# Patient Record
Sex: FEMALE | Race: WHITE | NOT HISPANIC OR LATINO | Employment: FULL TIME | URBAN - METROPOLITAN AREA
[De-identification: names, ages, dates, MRNs, and addresses within clinical notes are randomized per-mention and may not be internally consistent; named-entity substitution may affect disease eponyms.]

---

## 2017-04-11 ENCOUNTER — TELEPHONE (OUTPATIENT)
Dept: NEUROLOGY | Facility: CLINIC | Age: 57
End: 2017-04-11

## 2017-04-11 NOTE — TELEPHONE ENCOUNTER
----- Message from Natalie Pelaez sent at 4/11/2017  9:39 AM CDT -----  Contact: Pt  Pt missed a call from the Dr's Office and would like a return call from the nurse.    Pt can be reached at [phone].    Thanks

## 2017-04-11 NOTE — TELEPHONE ENCOUNTER
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  She is requesting Staff contact her.    She can be reached at [phone].    Thank you.

## 2020-11-23 ENCOUNTER — CLINICAL SUPPORT (OUTPATIENT)
Dept: INTERNAL MEDICINE | Facility: CLINIC | Age: 60
End: 2020-11-23
Payer: COMMERCIAL

## 2020-11-23 ENCOUNTER — OFFICE VISIT (OUTPATIENT)
Dept: NEUROLOGY | Facility: CLINIC | Age: 60
End: 2020-11-23
Payer: COMMERCIAL

## 2020-11-23 VITALS
SYSTOLIC BLOOD PRESSURE: 114 MMHG | BODY MASS INDEX: 27.42 KG/M2 | DIASTOLIC BLOOD PRESSURE: 56 MMHG | WEIGHT: 154.75 LBS | HEIGHT: 63 IN | HEART RATE: 79 BPM

## 2020-11-23 DIAGNOSIS — I10 HYPERTENSION, UNSPECIFIED TYPE: ICD-10-CM

## 2020-11-23 DIAGNOSIS — K21.9 GASTROESOPHAGEAL REFLUX DISEASE WITHOUT ESOPHAGITIS: ICD-10-CM

## 2020-11-23 DIAGNOSIS — I69.30 SEQUELA OF LACUNAR INFARCTION: ICD-10-CM

## 2020-11-23 DIAGNOSIS — G43.E09 CHRONIC MIGRAINE WITH AURA: Primary | ICD-10-CM

## 2020-11-23 DIAGNOSIS — E03.9 HYPOTHYROIDISM, UNSPECIFIED TYPE: ICD-10-CM

## 2020-11-23 DIAGNOSIS — G47.30 SLEEP APNEA, UNSPECIFIED TYPE: ICD-10-CM

## 2020-11-23 DIAGNOSIS — E78.5 HYPERLIPIDEMIA, UNSPECIFIED HYPERLIPIDEMIA TYPE: ICD-10-CM

## 2020-11-23 PROCEDURE — 96372 THER/PROPH/DIAG INJ SC/IM: CPT | Mod: S$GLB,,, | Performed by: PSYCHIATRY & NEUROLOGY

## 2020-11-23 PROCEDURE — 99999 PR PBB SHADOW E&M-NEW PATIENT-LVL III: ICD-10-PCS | Mod: PBBFAC,,, | Performed by: PSYCHIATRY & NEUROLOGY

## 2020-11-23 PROCEDURE — 99999 PR PBB SHADOW E&M-NEW PATIENT-LVL III: CPT | Mod: PBBFAC,,, | Performed by: PSYCHIATRY & NEUROLOGY

## 2020-11-23 PROCEDURE — 1125F PR PAIN SEVERITY QUANTIFIED, PAIN PRESENT: ICD-10-PCS | Mod: S$GLB,,, | Performed by: PSYCHIATRY & NEUROLOGY

## 2020-11-23 PROCEDURE — 96372 PR INJECTION,THERAP/PROPH/DIAG2ST, IM OR SUBCUT: ICD-10-PCS | Mod: S$GLB,,, | Performed by: PSYCHIATRY & NEUROLOGY

## 2020-11-23 PROCEDURE — 99205 OFFICE O/P NEW HI 60 MIN: CPT | Mod: S$GLB,,, | Performed by: PSYCHIATRY & NEUROLOGY

## 2020-11-23 PROCEDURE — 1125F AMNT PAIN NOTED PAIN PRSNT: CPT | Mod: S$GLB,,, | Performed by: PSYCHIATRY & NEUROLOGY

## 2020-11-23 PROCEDURE — 99205 PR OFFICE/OUTPT VISIT, NEW, LEVL V, 60-74 MIN: ICD-10-PCS | Mod: S$GLB,,, | Performed by: PSYCHIATRY & NEUROLOGY

## 2020-11-23 PROCEDURE — 3008F BODY MASS INDEX DOCD: CPT | Mod: CPTII,S$GLB,, | Performed by: PSYCHIATRY & NEUROLOGY

## 2020-11-23 PROCEDURE — 3008F PR BODY MASS INDEX (BMI) DOCUMENTED: ICD-10-PCS | Mod: CPTII,S$GLB,, | Performed by: PSYCHIATRY & NEUROLOGY

## 2020-11-23 RX ORDER — PROMETHAZINE HYDROCHLORIDE 25 MG/1
25 TABLET ORAL EVERY 4 HOURS
Qty: 30 TABLET | Refills: 1 | Status: SHIPPED | OUTPATIENT
Start: 2020-11-23 | End: 2021-07-07

## 2020-11-23 RX ORDER — DIPHENHYDRAMINE HCL 50 MG
50 CAPSULE ORAL EVERY 6 HOURS PRN
Qty: 30 CAPSULE | Refills: 3 | Status: SHIPPED | OUTPATIENT
Start: 2020-11-23

## 2020-11-23 RX ORDER — KETOROLAC TROMETHAMINE 30 MG/ML
30 INJECTION, SOLUTION INTRAMUSCULAR; INTRAVENOUS
Status: COMPLETED | OUTPATIENT
Start: 2020-11-23 | End: 2020-11-23

## 2020-11-23 RX ORDER — PRAVASTATIN SODIUM 10 MG/1
10 TABLET ORAL DAILY
Qty: 90 TABLET | Refills: 3 | Status: SHIPPED | OUTPATIENT
Start: 2020-11-23 | End: 2021-12-08

## 2020-11-23 RX ORDER — KETOROLAC TROMETHAMINE 30 MG/ML
30 INJECTION, SOLUTION INTRAMUSCULAR; INTRAVENOUS EVERY 8 HOURS PRN
Qty: 10 ML | Refills: 1 | Status: SHIPPED | OUTPATIENT
Start: 2020-11-23 | End: 2021-03-25

## 2020-11-23 RX ADMIN — KETOROLAC TROMETHAMINE 30 MG: 30 INJECTION, SOLUTION INTRAMUSCULAR; INTRAVENOUS at 02:11

## 2020-11-23 NOTE — LETTER
November 24, 2020      Huey Hou MD  [address] [facility] [place] [address]           [facility] [id number]  [place] [address]  Phone: [phone]  Fax: [phone]          Patient: Teena Amezquita   MR Number: [id number]   YOB: 1960   Date of Visit: 11/23/2020       Dear Dr. Huey Hou:    Thank you for referring Teena Amezquita to me for evaluation. Attached you will find relevant portions of my assessment and plan of care.    If you have questions, please do not hesitate to call me. I look forward to following Teena Amezquita along with you.    Sincerely,    Vilma Dodson MD    Enclosure  CC:  No Recipients    If you would like to receive this communication electronically, please contact [email or web address] or [phone] to request more information on [employer] access.    For providers and/or their staff who would like to refer a patient to [facility], please contact us through our one-stop-shop provider referral line, [facility] [place], at [phone].    If you feel you have received this communication in error or would no longer like to receive these types of communications, please e-mail [email or web address]
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
## 2020-11-23 NOTE — PATIENT INSTRUCTIONS
Thank you for coming    I recommend the following:    You have chronic migraine with aura:    Abortives:  -Stop Excedrin- overuse can cause medication overuse headache   -Try Ubrelvy 50 mg at headache onset and can repeat dose 2 hours later if still with headache   -Stop Nurtec  If Ubrelvy does not work and you have severe pain you can try the following->  -Toradol 30 mg intramuscular shot, benadryl 25 mg and phenergan 25 mg - up to 3 times/ day   - use sparingly once a week or so   -DHE and Triptans - contrindicated due to h/ stroke    Research the Magnetic stimulator and Cefaly       Prophylactics:  Continue Vyepti infusions   If no benefit could go back to Emgality   Nerve blocks with Dr. Alie Hernandez once cleared by an Allergy specialist      Migraine jagruti brian- download on your phone       H/o stroke  Take a baby Aspirin 81 mg  every day   [id number], keep an eye on your cholesterol   - Take Pravachol 10 mg at night     See the Sleep specialist        Patient Education  American Headache Society (AHS)  http://www.americanheadachesociety.org  Patient education material and headache journals  National Headache Foundation  http://www.headaches.org  Patient education material  National Migraine Association (MAGNUM)  http://www.migraines.org  Patient education material and news and publications on migraine.  Migraine Action Associateion  http://www.migraine.org.uk  Good education and support group training  Organization for the Understanding of Cluster Headache (OUCH)  http://www.ouch-us.org  Best sites devoted to cluster headache  Relaxation  http://www.helpguide.org  On L side column choose Stress & Trauma, then relaxation. A good guide on different  relaxation techniques.  Also tips for better sleeping on L side column choose sleep.        How to keep a Headache Calendar   Keeping a calendar of your headaches will help you and your doctor in the care of your  headaches.  Calendars will help identify:  -headache  triggers  -if you are overusing medications  -if you are waiting too long to treat your headaches  -how many days a week you are having a headache  -if your headaches are decreasing with time while on a preventive medication.  How to keep an effective headache calendar:  Access  -Keep a small calendar at your bedside. Do not feel that you have to complete the  calendar during a headache, but having it in one location will make it easier to  remember to fill in.  -Bring your calendar with you to every office visit to be reviewed with your doctor  Severity  -When you have a headache or before you go to bed if you have daily headaches, log in  the severity of your headache on a scale of 1-10 (10 being the worse)  Medication  -log in what medication you took to abort your headache.  -you do not have to log your daily medications in the journal  -use abbreviations or symbols to help you fit everything in, just remember what they are  when the doctor asks you what your symbols mean.  Triggers  -if you can identify a trigger, write it in, if not, skip this part of the calendar.      Headache Triggers  Almost anything can trigger or precipitate a headache. Research has shown that  triggers are very individual to patients, and that no single entity acts as a trigger for all  patients with headaches. Often, a trigger may not consistently provoke an attack. For  example, drinking a glass of red wine the other day triggered a migraine, but it may not  trigger a migraine 2 days later. Sometimes being exposed to multiple triggers is what is  needed to bring on a headache attack (drinking a glass of red wine after a stressful day  where you skipped lunch).  In patients who experience migraine headaches, their brain may be hypersensitive to  changes in their internal and external environment. Following exposure to a sufficient  trigger, their brain may respond by initiating a chain of electrical events that are  clinically  expressed as a migraine.  Some environmental changes more commonly trigger headache attacks. In a survey of  200 patients with migraine, the most commonly cited triggers were as follows:  Physical or emotional stress (77%)  Menses (72%)  Exposure to bright or flickering light (65%)  Various odors (61%)  Other commonly named triggers for headache are alcohol, aged cheeses, nitrate or  monosodium glutamate containing food items, changes in weather pattern, changes in  sleep cycles, and fasting1  .  1 Adapted from Stephen BORRERO. The Truth About Triggers. Headache 2008;48:499-500      Tips for Migraine Control  Changes in your internal and external environment can trigger migraine headaches. It  is important to control your environment the best you can. The following are some tips  to help you regulate your internal environment and reduce the frequency of migraines.  1. Good sleep habits are essential for good migraine control. Try to get the  same amount of sleep every night. Go to bed and wake up around the same  time every day, including weekends.  2. Reduce your caffeine intake. Excessive caffeine can cause headaches, so try  to use minimal amounts of caffeine every day. Remember, caffeine is found not  only in coffee, but in soda and tea as well.  3. Exercise daily. Mild to moderate exercise for 20-30 minutes a day can help  reduce the frequency of headaches by lowering your stress level, and increasing  your bodys natural endorphins.  4. Eat regular meals. Skipping meals can lead to a lowering in your blood sugar  that can trigger a migraine. Keep snacks with you when you are travelling if you  think you may miss a meal.  5. Drink more water and less alcohol. Keeping hydrated is essential for good  health and migraine control. Alcohol can be a trigger for migraines, so drink  alcohol in moderation.  6. Limit medication use. If you are using over the counter or prescription  medication to treat 3 or  more headaches a week, you may be causing a  headache.  7. Keep a headache calendar. Note things that may trigger your headaches, like  lack of sleep, weather changes, missed meals, stressful events, alcohol use,  caffeine intake, or eating certain foods. Keep note of medications you are using  to treat your headache. This will help you identify and manage triggers for your  headache, track how often you are using medications, and give you a better  sense of what things are working or not working for your headaches.    Medication for headache control  Your doctor may prescribe medication to treat your headaches. For better control of  headaches you may be placed on a preventive medication and to treat a headache  attack you may be placed on an abortive medication.  Preventive medication to treat headaches  A preventive medication is a medication that you take every day to help prevent  headaches from occurring, to make headaches less severe when they do occur, and to  make headaches more responsive to medication you take for an attack.  You are a candidate to be on a preventive medication when you are having one or more  headaches a week, or if when your headaches occur they are so debilitating that you  prefer to prevent them from happening.  There is no medication that is specially designed to prevent headaches. Instead, we  use medications from three major classes of drugs:  Anti-depressants- such as nortriptiline, venlafaxine, and duloxetine  Anti-seizure medications- such as topiramate, depakote, and gabapentin  Anti-hypertensive medications- such as propranolol, verapamil, and lisinopril  Your doctor will interview you to assess what type of headaches you have, how often  they are occurring, how debilitating they are, and what your other medical conditions  and risk factors you have. Based on this profile, your doctor may choose to place you  on a preventive medication that belongs in one of the classes above.  This does not  mean that you are depressed, have seizures, or high blood pressure.  Most preventive medications take 6-8 weeks before they reach maximum benefit. In  this time you may experience some side effects of the medication without maximum  relief. It does not take much time for headaches to become out of control and severe,  but it takes time for the medication to work. While this may be frustrating, it is important  to continue taking your medication to fully assess if it was helpful in treating your  headaches.  Abortive medication to treat headaches  An abortive medication is a medication that you take when you are having a headache  attack. It can be a single medication, or a combination of medications to be taken at the  start of a headache. It is important to treat a headache as soon as it begins; the longer  you wait to treat, the less likely the medication will work to treat the headache attack. It  is also important not to treat your headache more than 2-3 days a week because of the  risk of medication overuse headaches.     There are many types of medications used to abort a headache. Some are specific for  headache treatment, while others are used to treat the inflammatory response a  headache creates, or to treat both headache and other associated symptoms (like  nausea). We use medications from five major classes of drugs:  NSAIDS- such as ibuprofen, naproxen, diclofenac, etc  Triptans- such as sumatriptan, rizatriptan, zolmitriptan, etc  Ergotamine- such as DHE  Antiemetics- such as metochlopramide, Prochlorperazine, or promethazine  Antipsychotics- such as olanzapine or chlorperazine  After your interview, your doctor will prescribe you medication to treat headache attacks  based on your diagnosis and risk profile. You may be given multiple medications to  treat an attack, do not hesitate to ask to have your plan written for you so that you know  what to do when you are having a headache  attack.

## 2020-11-24 PROBLEM — I10 HYPERTENSION: Status: ACTIVE | Noted: 2020-11-24

## 2020-11-24 PROBLEM — E03.9 HYPOTHYROIDISM: Status: ACTIVE | Noted: 2020-11-24

## 2020-11-24 PROBLEM — G43.E09 CHRONIC MIGRAINE WITH AURA: Status: ACTIVE | Noted: 2020-11-24

## 2020-11-24 PROBLEM — E78.5 HYPERLIPIDEMIA: Status: ACTIVE | Noted: 2020-11-24

## 2020-11-24 PROBLEM — K21.9 GASTROESOPHAGEAL REFLUX DISEASE WITHOUT ESOPHAGITIS: Status: ACTIVE | Noted: 2020-11-24

## 2020-11-24 RX ORDER — LANOLIN ALCOHOL/MO/W.PET/CERES
1000 CREAM (GRAM) TOPICAL
COMMUNITY

## 2020-11-24 RX ORDER — SYRING-NEEDL,DISP,INSUL,0.3 ML 29 G X1/2"
296 SYRINGE, EMPTY DISPOSABLE MISCELLANEOUS ONCE
COMMUNITY

## 2020-11-24 RX ORDER — UBROGEPANT 100 MG/1
100 TABLET ORAL ONCE AS NEEDED
Qty: 30 TABLET | Refills: 2 | Status: SHIPPED | OUTPATIENT
Start: 2020-11-24 | End: 2020-11-24

## 2020-11-24 RX ORDER — MULTIVIT WITH IRON,MINERALS
1 TABLET ORAL
COMMUNITY

## 2020-11-24 RX ORDER — AMLODIPINE BESYLATE 5 MG/1
TABLET ORAL
COMMUNITY
Start: 2020-08-31

## 2020-11-24 RX ORDER — OXYBUTYNIN CHLORIDE 10 MG/1
10 TABLET, EXTENDED RELEASE ORAL
COMMUNITY
Start: 2020-10-22 | End: 2021-10-22

## 2020-11-24 RX ORDER — RIMEGEPANT SULFATE 75 MG/75MG
75 TABLET, ORALLY DISINTEGRATING ORAL ONCE AS NEEDED
COMMUNITY

## 2020-11-24 RX ORDER — METOPROLOL SUCCINATE 50 MG/1
TABLET, EXTENDED RELEASE ORAL
COMMUNITY
Start: 2020-10-09

## 2020-11-24 RX ORDER — KETOROLAC TROMETHAMINE 10 MG/1
10 TABLET, FILM COATED ORAL
COMMUNITY
Start: 2020-09-16 | End: 2021-05-04 | Stop reason: SDUPTHER

## 2020-11-24 RX ORDER — DEXLANSOPRAZOLE 60 MG/1
CAPSULE, DELAYED RELEASE ORAL
COMMUNITY
Start: 2020-06-16

## 2020-11-24 RX ORDER — CHOLECALCIFEROL (VITAMIN D3) 25 MCG
1000 TABLET ORAL
COMMUNITY

## 2020-11-24 RX ORDER — GINKGO BILOBA LEAF EXTRACT 60 MG
500 CAPSULE ORAL
COMMUNITY

## 2020-11-24 RX ORDER — LOSARTAN POTASSIUM 25 MG/1
25 TABLET ORAL
COMMUNITY
Start: 2020-04-27 | End: 2021-04-27

## 2020-11-24 RX ORDER — ALBUTEROL SULFATE 90 UG/1
2 AEROSOL, METERED RESPIRATORY (INHALATION)
COMMUNITY

## 2020-11-24 RX ORDER — FLUTICASONE FUROATE AND VILANTEROL TRIFENATATE 100; 25 UG/1; UG/1
POWDER RESPIRATORY (INHALATION)
COMMUNITY
Start: 2020-11-17

## 2020-11-24 RX ORDER — LEVOTHYROXINE SODIUM 88 UG/1
TABLET ORAL
COMMUNITY
Start: 2020-09-25

## 2020-11-24 RX ORDER — BIOTIN 1 MG
500 TABLET ORAL
COMMUNITY

## 2020-11-27 ENCOUNTER — TELEPHONE (OUTPATIENT)
Dept: NEUROLOGY | Facility: CLINIC | Age: 60
End: 2020-11-27

## 2020-11-27 NOTE — TELEPHONE ENCOUNTER
Called patient back and changed appointment to NP at 2:30 the same day. Patient stated that this works better since she is coming from [place]. Patient also stated that she is getting the lab work done to check for the allergy and will bring results with her.

## 2020-11-27 NOTE — TELEPHONE ENCOUNTER
----- Message from Gloria Fair sent at 11/27/2020 10:34 AM CST -----  Contact: BRIANA DE [[id number]]  Type: Patient Call Back Who called:BRIANA DE [[id number]] What is the request in detail:Patient would like a call back in regards to a order being put in the system for her to have blood work for the lidocaine allergy. [facility] Would the patient rather a call back or a response via [facility]? Call back Best call back number:[phone] (mobile)   Additional Information:

## 2020-11-29 ENCOUNTER — PATIENT MESSAGE (OUTPATIENT)
Dept: NEUROLOGY | Facility: CLINIC | Age: 60
End: 2020-11-29

## 2020-11-30 ENCOUNTER — PATIENT MESSAGE (OUTPATIENT)
Dept: NEUROLOGY | Facility: CLINIC | Age: 60
End: 2020-11-30

## 2020-11-30 ENCOUNTER — TELEPHONE (OUTPATIENT)
Dept: NEUROLOGY | Facility: CLINIC | Age: 60
End: 2020-11-30

## 2020-11-30 DIAGNOSIS — T78.40XA ALLERGY, INITIAL ENCOUNTER: Primary | ICD-10-CM

## 2020-11-30 NOTE — TELEPHONE ENCOUNTER
----- Message from Roxy Aguilar sent at 11/30/2020 10:11 AM CST -----  Contact: self  Type: Needs Medical Advice  Who Called:  leidy    Best Call Back Number: [phone] (home)     Additional Information: patient schedule for a nerve Block tomorrow, please contact to advise if lidocaine will be used, as she is allergic, contact to advise if something else can be used.

## 2020-11-30 NOTE — TELEPHONE ENCOUNTER
Spoke with patient in regards to seeing an allergist who would order it. Dr. Dodson states she can only put a referral in to see allergy specialist

## 2020-11-30 NOTE — TELEPHONE ENCOUNTER
----- Message from Grisel Taveras sent at 11/30/2020  8:39 AM CST -----  Contact: self @ [phone]  Pt says she needs to get her labs done 1st thing this morning so that they can be transferred on time this morning to have her results by tomorrow so she can get her procedure done.  Pls fax orders to [facility] at [phone] attn: Yvette.  Pls call pt after they are faxed.

## 2020-12-01 ENCOUNTER — TELEPHONE (OUTPATIENT)
Dept: NEUROLOGY | Facility: CLINIC | Age: 60
End: 2020-12-01

## 2020-12-01 ENCOUNTER — OFFICE VISIT (OUTPATIENT)
Dept: NEUROLOGY | Facility: CLINIC | Age: 60
End: 2020-12-01
Payer: COMMERCIAL

## 2020-12-01 VITALS — TEMPERATURE: 98 F | WEIGHT: 154.31 LBS | RESPIRATION RATE: 20 BRPM | BODY MASS INDEX: 27.34 KG/M2 | HEIGHT: 63 IN

## 2020-12-01 DIAGNOSIS — G43.719 INTRACTABLE CHRONIC MIGRAINE WITHOUT AURA AND WITHOUT STATUS MIGRAINOSUS: Primary | ICD-10-CM

## 2020-12-01 PROCEDURE — 1125F PR PAIN SEVERITY QUANTIFIED, PAIN PRESENT: ICD-10-PCS | Mod: S$GLB,,, | Performed by: NURSE PRACTITIONER

## 2020-12-01 PROCEDURE — 99215 PR OFFICE/OUTPT VISIT, EST, LEVL V, 40-54 MIN: ICD-10-PCS | Mod: 25,S$GLB,, | Performed by: NURSE PRACTITIONER

## 2020-12-01 PROCEDURE — 1125F AMNT PAIN NOTED PAIN PRSNT: CPT | Mod: S$GLB,,, | Performed by: NURSE PRACTITIONER

## 2020-12-01 PROCEDURE — 3008F PR BODY MASS INDEX (BMI) DOCUMENTED: ICD-10-PCS | Mod: CPTII,S$GLB,, | Performed by: NURSE PRACTITIONER

## 2020-12-01 PROCEDURE — 99999 PR PBB SHADOW E&M-EST. PATIENT-LVL V: ICD-10-PCS | Mod: PBBFAC,,, | Performed by: NURSE PRACTITIONER

## 2020-12-01 PROCEDURE — 3008F BODY MASS INDEX DOCD: CPT | Mod: CPTII,S$GLB,, | Performed by: NURSE PRACTITIONER

## 2020-12-01 PROCEDURE — 96372 PR INJECTION,THERAP/PROPH/DIAG2ST, IM OR SUBCUT: ICD-10-PCS | Mod: S$GLB,,, | Performed by: NURSE PRACTITIONER

## 2020-12-01 PROCEDURE — 99215 OFFICE O/P EST HI 40 MIN: CPT | Mod: 25,S$GLB,, | Performed by: NURSE PRACTITIONER

## 2020-12-01 PROCEDURE — 99999 PR PBB SHADOW E&M-EST. PATIENT-LVL V: CPT | Mod: PBBFAC,,, | Performed by: NURSE PRACTITIONER

## 2020-12-01 PROCEDURE — 96372 THER/PROPH/DIAG INJ SC/IM: CPT | Mod: S$GLB,,, | Performed by: NURSE PRACTITIONER

## 2020-12-01 RX ORDER — ONDANSETRON 2 MG/ML
4 INJECTION INTRAMUSCULAR; INTRAVENOUS
Status: COMPLETED | OUTPATIENT
Start: 2020-12-01 | End: 2020-12-01

## 2020-12-01 RX ORDER — KETOROLAC TROMETHAMINE 30 MG/ML
30 INJECTION, SOLUTION INTRAMUSCULAR; INTRAVENOUS
Status: COMPLETED | OUTPATIENT
Start: 2020-12-01 | End: 2020-12-01

## 2020-12-01 RX ADMIN — KETOROLAC TROMETHAMINE 30 MG: 30 INJECTION, SOLUTION INTRAMUSCULAR; INTRAVENOUS at 03:12

## 2020-12-01 RX ADMIN — ONDANSETRON 4 MG: 2 INJECTION INTRAMUSCULAR; INTRAVENOUS at 03:12

## 2020-12-01 NOTE — TELEPHONE ENCOUNTER
----- Message from Je Contreras sent at 12/1/2020  8:16 AM CST -----  Regarding: Pt advice  Contact: pt  Type:  Patient Returning Call    Who Called:  pt  Does the patient know what this is regarding?:  please follow up with pt to verify medicine used for appt. Stating will be leaving home at 11  Best Call Back Number:    Additional Information:  Thank you

## 2020-12-01 NOTE — TELEPHONE ENCOUNTER
----- Message from Karyn Campo MA sent at 12/1/2020 10:21 AM CST -----  PT is requesting a call back in regards to her scheduled appt today   Call back # [phone]

## 2020-12-01 NOTE — TELEPHONE ENCOUNTER
Patient needs allergy clearance prior to any procedures involving lidocaine. This was discussed during visit today.

## 2020-12-01 NOTE — TELEPHONE ENCOUNTER
Patient arrived early for appointment. Wants to discuss about allergy to Lidocaine. Informed that she can discuss with NP before anything is done to her. Verbalized understanding.

## 2020-12-01 NOTE — PROGRESS NOTES
"Date of service: 12/1/2020  Referring provider: No ref. provider found     Subjective:      Chief complaint: Headache       Patient ID: Teena Amezquita is a 60 y.o. female with past medical history of HTN, HLD, hypothyroidism, GERD and migraine who presents for new patient evaluation of headache.    She saw Dr. Dodson 11/23/2020 who recommended nerve blocks. Referred to this clinic for nerve blocks.  She is also followed by Dr. Hou as well. Per his note, patient has "seen 16 different headache specialist including specialist at [facility] in [facility]"    History of Present Illness    ORIGINAL HEADACHE HISTORY - 12/1/2020  Age at onset and course over time: 1972 at age 12.  Location: all over, mostly over one eye or the other   Quality:  [] pressure [] tight [x] throbbing [x] sharp [x] stabbing   Severity: current 7 with range 4-10  Duration: days, constant  Frequency: daily, no headache free days  Headaches awaken at night?: yes, 3 nights per week   Worst time of day: varies  Associated with: [x] photophobia [x]  phonophobia [x] osmophobia [x] blurred vision Aura  [] double vision [x] loss of appetite [x] nausea [x] vomiting [x] dizziness [] vertigo  [] tinnitus [x] irritability [] sinus pressure [] problems with concentration   [x] neck tightness   Alleviated by:  [x] sleep [x] darkness [] massage [] heat [] ice [x] medication  Exacerbated by:  [x] fatigue [x] light [x] noise [x] smells [] coughing [] sneezing  [] bending over [] ovulation [] menses [] alcohol [x] change in weather [x]  stress  Ipsilateral autonomic: [] nasal congestion [] lacrimation [] ptosis [] injection [] edema [] foreign body sensation [] ear fullness   ICP:  [] transient visual obscurations  [] tinnitus   [] positional headache  [x] non-positional   Sleep habits: trouble falling asleep, trouble staying asleep, unrefreshing sleep  Caffeine intake: 2 cups coffee  Gyn status (if female): partial hysterectomy   MIDAS: 91 " indicating severe disability    Current acute treatment:  Nurtec  Phenergan  toradol - days a week  Tylenol - daily  Ubrelvy - 2 days per week    Current prevention:  Vyepti  Metoprolol  (losartan)  (amlodipine)     Previously tried/failed acute treatment:  nubain  Stadol  midrin  Naproxen  Sumatriptan  Demerol  zomig  Axert  Relpax  Treximet  Bupap  Fioricet  Goodys  Excedrin  Infusions (toradol, decadron, phenergan, DHE)    Previously tried/failed preventative treatment:  zonegran  paxil  topamax  Botox  Depakote  Keppra  Toprol XL  Emgality  Aimovig  Dilantin  lyrica  prozac  wellbutrin  celexa  Verapamil      Review of patient's allergies indicates:   Allergen Reactions    Amoxicillin-pot clavulanate Shortness Of Breath    Codeine Shortness Of Breath    Hydrocodone Rash and Shortness Of Breath    Lidocaine Shortness Of Breath     Lidocaine patch on back for migraines    Scopolamine Rash     Trans derm patch     Sulfa (sulfonamide antibiotics) Swelling    Tetracyclines Shortness Of Breath    Azithromycin Diarrhea    Imitrex [sumatriptan succinate]     Keflex [cephalexin]     Nexium [esomeprazole magnesium] Diarrhea    Nitrous oxide Other (See Comments)     during dental work started to go into coma    Paxil [paroxetine hcl]     Stadol [butorphanol tartrate]     Oxycodone Rash     Current Outpatient Medications   Medication Sig Dispense Refill    albuterol (PROVENTIL/VENTOLIN HFA) 90 mcg/actuation inhaler Inhale 2 puffs into the lungs.      amLODIPine (NORVASC) 5 MG tablet TAKE ONE TABLET BY MOUTH TWICE A DAY      ascorbic acid, vitamin C, (VITAMIN C) 100 MG tablet Take 500 mg by mouth.      biotin 1 mg tablet Take 500 mcg by mouth.      cyanocobalamin (VITAMIN B-12) 1000 MCG tablet Take 1,000 mcg by mouth.      dexlansoprazole (DEXILANT) 60 mg capsule TAKE ONE CAPSULE BY MOUTH EVERY DAY      diphenhydrAMINE (BENADRYL) 50 MG capsule Take 1 capsule (50 mg total) by mouth every 6 (six) hours as  needed for Itching. 30 capsule 3    evening primrose oil 500 mg Cap Take 500 mg by mouth.      fluticasone furoate-vilanteroL (BREO ELLIPTA) 100-25 mcg/dose diskus inhaler INHALE 1 PUFF INTO THE LUNGS ONCE DAILY      ketorolac (TORADOL) 10 mg tablet Take 10 mg by mouth.      ketorolac (TORADOL) 30 mg/mL (1 mL) injection Inject 1 mL (30 mg total) into the muscle every 8 (eight) hours as needed (no more than 3 doses in 24 hours). 10 mL 1    levothyroxine (SYNTHROID) 88 MCG tablet Take 1 tablet daily on an empty stomach      losartan (COZAAR) 25 MG tablet Take 25 mg by mouth.      magnesium citrate solution Take 296 mLs by mouth once.      metoprolol succinate (TOPROL-XL) 50 MG 24 hr tablet TAKE 1 AND 1/2 TABLETS IN THE MORNING      oxybutynin (DITROPAN-XL) 10 MG 24 hr tablet Take 10 mg by mouth.      potassium gluconate 2.5 mEq Tab Take 1 tablet by mouth.      pravastatin (PRAVACHOL) 10 MG tablet Take 1 tablet (10 mg total) by mouth once daily. 90 tablet 3    promethazine (PHENERGAN) 25 MG tablet Take 1 tablet (25 mg total) by mouth every 4 (four) hours. 30 tablet 1    vitamin D (VITAMIN D3) 1000 units Tab Take 1,000 Units by mouth.      vitamin E 100 UNIT capsule Take 450 mg by mouth.      rimegepant (NURTEC) 75 mg odt Take 75 mg by mouth once as needed for Migraine. Place ODT tablet on the tongue; alternatively the ODT tablet may be placed under the tongue       Current Facility-Administered Medications   Medication Dose Route Frequency Provider Last Rate Last Dose    ketorolac injection 30 mg  30 mg Intramuscular 1 time in Clinic/HOD Kim Hatch NP        ondansetron injection 4 mg  4 mg Intramuscular 1 time in Clinic/HOD Kim Hatch NP           Past Medical History  Past Medical History:   Diagnosis Date    Headache     Stroke        Past Surgical History  No past surgical history on file.    Family History  No family history on file.    Social History  Social History      Socioeconomic History    Marital status:      Spouse name: Not on file    Number of children: Not on file    Years of education: Not on file    Highest education level: Not on file   Occupational History    Not on file   Social Needs    Financial resource strain: Not on file    Food insecurity     Worry: Not on file     Inability: Not on file    Transportation needs     Medical: Not on file     Non-medical: Not on file   Tobacco Use    Smoking status: Former Smoker     Start date: 1979     Quit date: 1980     Years since quittin.0    Smokeless tobacco: Never Used   Substance and Sexual Activity    Alcohol use: Yes     Frequency: Monthly or less     Drinks per session: 1 or 2    Drug use: Never    Sexual activity: Yes     Partners: Male   Lifestyle    Physical activity     Days per week: Not on file     Minutes per session: Not on file    Stress: Not on file   Relationships    Social connections     Talks on phone: Not on file     Gets together: Not on file     Attends Yazdanism service: Not on file     Active member of club or organization: Not on file     Attends meetings of clubs or organizations: Not on file     Relationship status: Not on file   Other Topics Concern    Not on file   Social History Narrative    Not on file        Review of Systems  14-point review of systems as follows:   No check devin indicates NEGATIVE response   Constitutional: [] weight loss, [] change to appetite   Eyes: [] change in vision, [] double vision   Ears, nose, mouth, throat: [] frequent nose bleeds, [] ringing in the ears   Respiratory: [] cough, [] wheezing   Cardiovascular: [] chest pain, [] palpitations   Gastrointestinal: [] jaundice, [] nausea/vomiting   Genitourinary: [] incontinence, [] burning with urination   Hematologic/lymphatic: [x] easy bruising/bleeding, [] night sweats   Neurological: [] numbness, [] weakness   Endocrine: [x] fatigue, [x] heat/cold intolerance    Allergy/Immunologic: [] fevers, [] chills   Musculoskeletal: [x] muscle pain, [x] joint pain   Psychiatric: [] thoughts of harming self/others, [] depression   Integumentary: [] rashes, [] sores that do not heal     Objective:        Vitals:    12/01/20 1344   Resp: 20   Temp: 97.5 °F (36.4 °C)     Body mass index is 27.34 kg/m².    12/1/2020  Constitutional: appears in no acute distress, well-developed, well-nourished     Ears, nose, mouth, throat: external appearance of ears and nose normal, hearing intact     Spine:   CERVICAL SPINE:  ROM: reduced  MUSCLE SPASM: diffuse  FACET LOADING: bilateral  SPURLING: no  DINO / GWEN tender: mild    Psychiatric: normal judgment and insight. Oriented to person, place, and time.     Neurologic:   Cortical functions: recent and remote memory intact, normal attention span and concentration, speech fluent, adequate fund of knowledge       Data Review:     I have personally reviewed the referring provider's notes, labs, & imaging made available to me today.      RADIOLOGY STUDIES:  I have personally reviewed the pertinent images performed.       No results found for this or any previous visit.    No results found for: BNP, NA, K, MG, CL, CO2, BUN, CREATININE, GLU, HGBA1C, MG, AST, ALT, ALBUMIN, PROT, BILITOT, CHOL, HDL, LDLCALC, TRIG    No results found for: WBC, HGB, HCT, MCV, PLT    No results found for: TSH        Assessment & Plan:       Problem List Items Addressed This Visit        Neuro    Intractable chronic migraine without aura and without status migrainosus - Primary    Relevant Medications    ketorolac injection 30 mg    ondansetron injection 4 mg            Patient has long-standing migraine history. Dr. Hou referred her to [facility] after she failed many prevention and acute medications. Dr. Dodson evaluated the patient last week who felt she might benefit from occipital nerve blocks. However, patient has an allergy to lidocaine. She was to have allergy clearance  prior to today's visit. She has not seen allergy. We had a lengthy discussion on how our clinic does nerve blocks and the medications we use. Discussed with Dr. Hernandez and both agree that it is not safe to do nerve block today. We use lidocaine and bupivacaine, with or without steroids. Our clinic contacted [facility] and they indicated to us that they can do the lidocaine allergy testing and do not need a referral. Information given to patient. If she gets clearance from allergy, I recommended she contact Dr. Hou first for possible nerve blocks as she stated its is difficult and expensive to drive to our clinic.     ADDENDUM: IM toradol and zofran given for pain and nausea. Patient reported some relief after injections.       No follow-ups on file.    Counseling:  More than 50% of the 60 minute encounter was spent face to face counseling the patient regarding current status and future plan of care as well as side of the medications. All questions were answered to patient's satisfaction    Kim Hatch NP

## 2020-12-01 NOTE — PATIENT INSTRUCTIONS
Contact Dr. Min or Dr. Jackson at the [place] [facility].  [address], [address]  [place] [address] [phone]    We contact them and they do lidocaine allergy testing. Once you have these results, we recommend contacting Dr. Hou to see if he will do nerve blocks for you. This will save you a trip to [place] if he will do them.

## 2020-12-04 ENCOUNTER — TELEPHONE (OUTPATIENT)
Dept: HEMATOLOGY/ONCOLOGY | Facility: CLINIC | Age: 60
End: 2020-12-04

## 2020-12-04 NOTE — TELEPHONE ENCOUNTER
Attempted call back, not correct clinic ----- Message from Eliana Ayala sent at 12/4/2020  2:39 PM CST -----  Type: Needs Medical Advice  Who Called: Cheri  Best Call Back Number: [phone]  Additional Information: I have the Cheri calling on the phone for you from the [facility] [place] wants a steff back she said its very important she speak to someone asap.

## 2021-03-25 DIAGNOSIS — I63.89 OTHER CEREBRAL INFARCTION: ICD-10-CM

## 2021-03-26 ENCOUNTER — PATIENT MESSAGE (OUTPATIENT)
Dept: NEUROLOGY | Facility: CLINIC | Age: 61
End: 2021-03-26

## 2021-03-31 ENCOUNTER — TELEPHONE (OUTPATIENT)
Dept: NEUROLOGY | Facility: CLINIC | Age: 61
End: 2021-03-31

## 2021-03-31 DIAGNOSIS — I63.89 OTHER CEREBRAL INFARCTION: ICD-10-CM

## 2021-04-09 ENCOUNTER — TELEPHONE (OUTPATIENT)
Dept: NEUROLOGY | Facility: CLINIC | Age: 61
End: 2021-04-09

## 2021-04-29 ENCOUNTER — TELEPHONE (OUTPATIENT)
Dept: NEUROLOGY | Facility: CLINIC | Age: 61
End: 2021-04-29

## 2021-04-30 ENCOUNTER — TELEPHONE (OUTPATIENT)
Dept: NEUROLOGY | Facility: CLINIC | Age: 61
End: 2021-04-30

## 2021-04-30 DIAGNOSIS — Z86.73 H/O: STROKE: Primary | ICD-10-CM

## 2021-04-30 DIAGNOSIS — I63.89 OTHER CEREBRAL INFARCTION: ICD-10-CM

## 2021-05-03 ENCOUNTER — PATIENT MESSAGE (OUTPATIENT)
Dept: NEUROLOGY | Facility: CLINIC | Age: 61
End: 2021-05-03

## 2021-05-04 ENCOUNTER — TELEPHONE (OUTPATIENT)
Dept: NEUROLOGY | Facility: CLINIC | Age: 61
End: 2021-05-04

## 2021-05-04 ENCOUNTER — PATIENT MESSAGE (OUTPATIENT)
Dept: NEUROLOGY | Facility: CLINIC | Age: 61
End: 2021-05-04

## 2021-05-04 DIAGNOSIS — R51.9 INTRACTABLE HEADACHE, UNSPECIFIED CHRONICITY PATTERN, UNSPECIFIED HEADACHE TYPE: ICD-10-CM

## 2021-05-04 RX ORDER — METHYLPREDNISOLONE 4 MG/1
TABLET ORAL
Qty: 1 PACKAGE | Refills: 0 | Status: SHIPPED | OUTPATIENT
Start: 2021-05-04 | End: 2021-05-25

## 2021-05-04 RX ORDER — KETOROLAC TROMETHAMINE 10 MG/1
10 TABLET, FILM COATED ORAL 2 TIMES DAILY
Qty: 10 TABLET | Refills: 3 | Status: SHIPPED | OUTPATIENT
Start: 2021-05-04 | End: 2021-10-15

## 2021-05-04 RX ORDER — UBROGEPANT 100 MG/1
100 TABLET ORAL ONCE AS NEEDED
Qty: 10 TABLET | Refills: 2 | Status: SHIPPED | OUTPATIENT
Start: 2021-05-04 | End: 2021-05-04

## 2021-05-07 ENCOUNTER — TELEPHONE (OUTPATIENT)
Dept: NEUROLOGY | Facility: CLINIC | Age: 61
End: 2021-05-07

## 2021-05-07 ENCOUNTER — PATIENT MESSAGE (OUTPATIENT)
Dept: NEUROLOGY | Facility: CLINIC | Age: 61
End: 2021-05-07

## 2021-05-10 ENCOUNTER — TELEPHONE (OUTPATIENT)
Dept: NEUROLOGY | Facility: CLINIC | Age: 61
End: 2021-05-10

## 2021-06-01 ENCOUNTER — TELEPHONE (OUTPATIENT)
Dept: NEUROLOGY | Facility: CLINIC | Age: 61
End: 2021-06-01

## 2021-08-12 ENCOUNTER — TELEPHONE (OUTPATIENT)
Dept: NEUROLOGY | Facility: CLINIC | Age: 61
End: 2021-08-12

## 2021-08-19 RX ORDER — KETOROLAC TROMETHAMINE 30 MG/ML
INJECTION, SOLUTION INTRAMUSCULAR; INTRAVENOUS
Qty: 6 ML | Refills: 3 | Status: SHIPPED | OUTPATIENT
Start: 2021-08-19 | End: 2022-08-18

## 2021-10-27 ENCOUNTER — PATIENT MESSAGE (OUTPATIENT)
Dept: NEUROLOGY | Facility: CLINIC | Age: 61
End: 2021-10-27
Payer: COMMERCIAL

## 2022-01-07 ENCOUNTER — TELEPHONE (OUTPATIENT)
Dept: NEUROLOGY | Facility: CLINIC | Age: 62
End: 2022-01-07
Payer: COMMERCIAL

## 2022-01-07 NOTE — TELEPHONE ENCOUNTER
----- Message from Ainsley Jernigan sent at 1/7/2022  9:36 AM CST -----  Who Called: BRIANA DE    What is the request in detail: Would like to speak to staff in regards to the ketorolac (TORADOL) 10 mg tablet rx being incorrect, states she requested the injection not the tablets. Please advise.     Can the clinic reply by [facility]?: Yes    Best Call Back Number: [phone]

## 2022-08-18 ENCOUNTER — PATIENT MESSAGE (OUTPATIENT)
Dept: NEUROLOGY | Facility: CLINIC | Age: 62
End: 2022-08-18
Payer: COMMERCIAL

## 2022-08-19 ENCOUNTER — PATIENT MESSAGE (OUTPATIENT)
Dept: NEUROLOGY | Facility: CLINIC | Age: 62
End: 2022-08-19
Payer: COMMERCIAL

## 2023-10-30 ENCOUNTER — TELEPHONE (OUTPATIENT)
Dept: NEUROLOGY | Facility: CLINIC | Age: 63
End: 2023-10-30
Payer: OTHER GOVERNMENT

## 2023-10-30 NOTE — TELEPHONE ENCOUNTER
----- Message from Francisco Jernigan sent at 10/30/2023 10:22 AM CDT -----  Type:  Patient Returning Call    Who Called:pt  Who Left Message for Patient:  Does the patient know what this is regarding?:insurance  Would the patient rather a call back or a response via [facility]? Call  Best Call Back Number:[phone]  Additional Information: pt states she would like to schedule an appt if provider accepts insurance. -  [place]

## 2023-11-03 ENCOUNTER — TELEPHONE (OUTPATIENT)
Dept: NEUROLOGY | Facility: CLINIC | Age: 63
End: 2023-11-03
Payer: OTHER GOVERNMENT

## 2023-11-03 NOTE — TELEPHONE ENCOUNTER
----- Message from Josey Brewer sent at 11/3/2023 11:55 AM CDT -----  Contact: Teena Dickinson is needing a call back in regards to scheduling an appt. Please give her a call back at [phone]

## 2023-11-08 ENCOUNTER — TELEPHONE (OUTPATIENT)
Dept: NEUROLOGY | Facility: CLINIC | Age: 63
End: 2023-11-08
Payer: OTHER GOVERNMENT

## 2023-11-09 NOTE — TELEPHONE ENCOUNTER
----- Message from Maria Teresa Pelaez sent at 11/8/2023 10:04 AM CST -----  Contact: Reggiey  Pt is calling in regards to getting an appt to re-establish care.Pt is still having bad headaches and migraines. Please call back as soon as possible at [phone]              Thanks  SW